# Patient Record
(demographics unavailable — no encounter records)

---

## 2024-11-14 NOTE — HISTORY OF PRESENT ILLNESS
[FreeTextEntry1] : 31year-old patient is here to discuss about her IUD, Patient has an IUD inserted Mirena in 2020, Patient wants to know if she needs to change it at this time as patient is concerned about her insurance coverage/ or termination. Patient is also concerned because she had a difficulty during insertion and had to go back the second time to have it inserted in spite of taking Cytotec.

## 2024-11-14 NOTE — PLAN
[FreeTextEntry1] : Contraceptive options discussed with the patient including other measures of contraception. Procedure also was discussed with the patient with IUD removal and insertion under sonogram guidance. All questions were answered, Patient will get back to us and let us know whether she needs the removal and insertion of new IUD now or at a later date  However patient is aware that present Mirena can stay until 2027.

## 2025-06-03 NOTE — HISTORY OF PRESENT ILLNESS
[FreeTextEntry1] : annual  [de-identified] : 31 year old female here for annual well visit. patient states ever since she had COVID last year she has been having issues with her tonsils, becoming enlarged and painful, hx of frequent strep as a kid requesting ENT referall. patient states left posterior knee issues when changing from sitting to standing she feels like its a slow movement. no pain. patient states for years she had issues she would have elevated BP become dizzy and then her BP would drop. she was seen by cards and Neuro a few years ago now less frequent. patient denies any chest pains no palpitations no issues today, patient denies abdominal pain no n/v/d. patient was on humiara in past but stopped due to immune issues

## 2025-06-03 NOTE — HISTORY OF PRESENT ILLNESS
[FreeTextEntry1] : annual  [de-identified] : 31 year old female here for annual well visit. patient states ever since she had COVID last year she has been having issues with her tonsils, becoming enlarged and painful, hx of frequent strep as a kid requesting ENT referall. patient states left posterior knee issues when changing from sitting to standing she feels like its a slow movement. no pain. patient states for years she had issues she would have elevated BP become dizzy and then her BP would drop. she was seen by cards and Neuro a few years ago now less frequent. patient denies any chest pains no palpitations no issues today, patient denies abdominal pain no n/v/d. patient was on humiara in past but stopped due to immune issues

## 2025-06-03 NOTE — HEALTH RISK ASSESSMENT
[Good] : ~his/her~  mood as  good [No] : No [Never] : Never [Patient reported PAP Smear was normal] : Patient reported PAP Smear was normal [With Family] : lives with family [Employed] : employed [de-identified] :

## 2025-06-03 NOTE — HEALTH RISK ASSESSMENT
[Good] : ~his/her~  mood as  good [No] : No [Never] : Never [Patient reported PAP Smear was normal] : Patient reported PAP Smear was normal [With Family] : lives with family [Employed] : employed [de-identified] :

## 2025-07-04 NOTE — DISCUSSION/SUMMARY
[FreeTextEntry1] : Pt's episodes of syncope are vasovagal in nature avoidance of triggers discussed, potential compression garments maintaining adequate PO/solute intake counterpressure maneuvers reviewed  TTE to evaluate for structural heart disease aggressive risk factor control given fam hx, hx of chronic inflammation Lp(a) screen in the future cont heart healthy lifestyle [EKG obtained to assist in diagnosis and management of assessed problem(s)] : EKG obtained to assist in diagnosis and management of assessed problem(s)

## 2025-07-04 NOTE — PHYSICAL EXAM
[Well Developed] : well developed [Well Nourished] : well nourished [No Acute Distress] : no acute distress [Normal Conjunctiva] : normal conjunctiva [Normal Venous Pressure] : normal venous pressure [No Carotid Bruit] : no carotid bruit [Normal S1, S2] : normal S1, S2 [No Murmur] : no murmur [No Rub] : no rub [No Gallop] : no gallop [Clear Lung Fields] : clear lung fields [Good Air Entry] : good air entry [No Respiratory Distress] : no respiratory distress  [Soft] : abdomen soft [Non Tender] : non-tender [Normal Gait] : normal gait [No Edema] : no edema [No Cyanosis] : no cyanosis [No Rash] : no rash [No Skin Lesions] : no skin lesions [Moves all extremities] : moves all extremities [No Focal Deficits] : no focal deficits [Normal Speech] : normal speech [Alert and Oriented] : alert and oriented [Normal memory] : normal memory [No Xanthelasma] : no xanthelasma

## 2025-07-04 NOTE — DISCUSSION/SUMMARY
[FreeTextEntry1] : Pt's episodes of syncope are vasovagal in nature avoidance of triggers discussed, potential compression garments maintaining adequate PO/solute intake counterpressure maneuvers reviewed  TTE to evaluate for structural heart disease aggressive risk factor control given fam hx, hx of chronic inflammation Lp(a) screen in the future cont heart healthy lifestyle [EKG obtained to assist in diagnosis and management of assessed problem(s)] : EKG obtained to assist in diagnosis and management of assessed problem(s) Previously Declined (within the last year)

## 2025-07-04 NOTE — HISTORY OF PRESENT ILLNESS
[FreeTextEntry1] : 31F suspected ankylosing spondylitis, fam hx of premature CAD, MVP who presents for cardiac evaluation  typical prodrome and vasovagal syncope repeat episodes usually in the setting of standing has happened while seated - once with prolonged sitting, and another not with prolonged sitting but in the setting of mod etoh  active tries to eat healthy  6/3/25: Chol 187/TG 46/HDL 64/  Fam hx: Father - CAD age 49, CABG age 62 Pat grandfather - age 53  Pregnancy hx: None Not currently family planning

## 2025-07-11 NOTE — PHYSICAL EXAM
[de-identified] :  General Appearance / Station: Well developed, well nourished, in no acute distress  Orientation: Oriented to person, place, and time Gait & Station: Ambulates without assistive device Neurologic: Normal leg sensation  Cardiovascular: Warm extremity  Lymphatics: No lymphedema  Generalized Ligament Laxity: Normal  Stiffness: Normal   LUMBAR SPINE: Nontender  at lumbar spine Straight leg raise: Negative  Motor: 5/5 motor L2-S1 Sensation: Intact  L2-S1  RIGHT HIP: Range of motion: Painless  internal and external rotation of the hip. Strength: Within Normal Limits  Palpation: Nontender  at greater trochanter. Nontender  at SI joint Stinchfield: Negative  FADIR: Negative  MENDEZ: Negative   SYMPTOMATIC LEFT KNEE: Alignment: Neutral Skin: normal Effusion: none . Quadriceps: normal . Range of motion: symmetrical but painful . PF crepitus: 1+. PF apprehension: none . Patella / Patella Tendon: nontender . Lachman's: negative  Valgus @ 30: negative. Varus @ 30: negative. Posterior drawer: negative. Palpation: TENDER AT medial and lateral joint line.

## 2025-07-11 NOTE — HISTORY OF PRESENT ILLNESS
[de-identified] :  ANA FRAUSTO is a 31 year F presents today with a chief complaint of left knee tightness. Patient describes onset over the last six months. Patient states she started noticing tightness to the back of the knee and cracking when coming up from a squatting position. She does not report pain but an uncomfortable feeling when this occurs.  Patient points to the back of the knee as maximum point of discomfort.  Symptoms are exacerbated by activity. They are improved with rest, and ice. Patient denies any radiation of symptoms beyond the surrounding area. Hip and ankle appear to be largely unrelated.   To address the pathology, they have tried OTC medications/NSAIDs with some relief.   At this point the patient is quite frustrated by their condition. As duration of symptoms exceeds a few months, she is here for further evaluation and discussion of possible diagnoses and management. Denies any trauma. No fever or chills, no signs of infection. Today, patient does not state any other associated signs or complains outside of those described.   A complete review of symptoms as well as past medical/surgical history, medications, allergies, social and family history, and other details of HPI and exam were reviewed per first visit intake form and updated accordingly. Additional and more relevant details are noted in further detail today.

## 2025-07-11 NOTE — DISCUSSION/SUMMARY
[de-identified] :  ANA FRAUSTO is a 31 year F pw left knee pain. The patient was extensively counseled on treatment options including but not limited to observation, rest/activity modification, bracing, anti-inflammatory medications, physical therapy, injections, and surgery. The natural history of the disease was thoroughly explained. We discussed that the majority of the time, this condition can be initially treated conservatively.  Patient will proceed with:  I have personally obtained the history, reviewed the ROS as noted, and performed the physical examination today. The patient and I discussed the assessment and options and developed the plan. All questions were answered and the patient stated their understanding of the treatment plan and appreciation of the visit.  My cumulative time spent on this patient's visit included: Preparation for the visit, review of the medical records, review of pertinent diagnostic studies, examination and counseling of the patient on the above diagnosis, treatment plan and prognosis, orders of diagnostic tests, medications and/or appropriate procedures and documentation in the medical records of today's visit